# Patient Record
Sex: MALE | HISPANIC OR LATINO | URBAN - METROPOLITAN AREA
[De-identification: names, ages, dates, MRNs, and addresses within clinical notes are randomized per-mention and may not be internally consistent; named-entity substitution may affect disease eponyms.]

---

## 2019-01-16 ENCOUNTER — OFFICE VISIT (OUTPATIENT)
Dept: URBAN - METROPOLITAN AREA CLINIC 32 | Facility: CLINIC | Age: 24
End: 2019-01-16
Payer: COMMERCIAL

## 2019-01-16 PROCEDURE — 92014 COMPRE OPH EXAM EST PT 1/>: CPT | Performed by: OPTOMETRIST

## 2019-01-16 NOTE — IMPRESSION/PLAN
Impression: Diagnosis: Keratoconus, stable, bilateral. Code: N43.672. Plan: matthew OU - shows KCN or variant OS> OD.   rec RGP Lens fit. 500/500/500

## 2021-03-05 ENCOUNTER — OFFICE VISIT (OUTPATIENT)
Dept: URBAN - METROPOLITAN AREA CLINIC 32 | Facility: CLINIC | Age: 26
End: 2021-03-05

## 2021-03-05 DIAGNOSIS — H52.13 MYOPIA, BILATERAL: ICD-10-CM

## 2021-03-05 PROCEDURE — 99214 OFFICE O/P EST MOD 30 MIN: CPT | Performed by: OPTOMETRIST

## 2021-03-05 PROCEDURE — 92310 CONTACT LENS FITTING OU: CPT | Performed by: OPTOMETRIST

## 2021-03-05 RX ORDER — POLYETHYLENE GLYCOL 400 AND PROPYLENE GLYCOL 4; 3 MG/ML; MG/ML
SOLUTION/ DROPS OPHTHALMIC
Qty: 60 | Refills: 10 | Status: ACTIVE
Start: 2021-03-05

## 2021-03-05 ASSESSMENT — KERATOMETRY
OD: 47.25
OS: 52.63

## 2021-03-05 ASSESSMENT — VISUAL ACUITY
OD: 20/50
OS: 20/50

## 2021-03-05 ASSESSMENT — INTRAOCULAR PRESSURE
OD: 12
OS: 13

## 2021-03-05 NOTE — IMPRESSION/PLAN
Impression: Diagnosis: Keratoconus, stable, bilateral. Code: M27.345.  Plan: refit RGP order lenses, AT /500/500

## 2021-04-02 ENCOUNTER — OFFICE VISIT (OUTPATIENT)
Dept: URBAN - METROPOLITAN AREA CLINIC 32 | Facility: CLINIC | Age: 26
End: 2021-04-02

## 2021-04-02 DIAGNOSIS — H18.613 KERATOCONUS, STABLE, BILATERAL: Primary | ICD-10-CM

## 2021-04-02 PROCEDURE — V2799 MISC VISION ITEM OR SERVICE: HCPCS | Performed by: OPTOMETRIST

## 2021-04-02 NOTE — IMPRESSION/PLAN
Impression: Diagnosis: Keratoconus, stable, bilateral. Code: Q14.477.  Plan: doing well with new lenses

## 2023-12-27 ENCOUNTER — OFFICE VISIT (OUTPATIENT)
Dept: URBAN - METROPOLITAN AREA CLINIC 32 | Facility: CLINIC | Age: 28
End: 2023-12-27
Payer: COMMERCIAL

## 2023-12-27 DIAGNOSIS — H52.13 MYOPIA, BILATERAL: Primary | ICD-10-CM

## 2023-12-27 DIAGNOSIS — H04.123 DRY EYE SYNDROME OF BILATERAL LACRIMAL GLANDS: ICD-10-CM

## 2023-12-27 DIAGNOSIS — H16.223 KERATOCONJUNCTIVITIS SICCA, NOT SPECIFIED AS SJ”GREN'S, BILATERAL: ICD-10-CM

## 2023-12-27 DIAGNOSIS — H18.613 KERATOCONUS, STABLE, BILATERAL: ICD-10-CM

## 2023-12-27 PROCEDURE — 92014 COMPRE OPH EXAM EST PT 1/>: CPT | Performed by: OPTOMETRIST

## 2023-12-27 RX ORDER — CYCLOSPORINE 0.5 MG/ML
0.05 % EMULSION OPHTHALMIC
Qty: 180 | Refills: 3 | Status: INACTIVE
Start: 2023-12-27 | End: 2024-03-25

## 2023-12-27 RX ORDER — PREDNISOLONE ACETATE 10 MG/ML
1 % SUSPENSION/ DROPS OPHTHALMIC
Qty: 5 | Refills: 1 | Status: ACTIVE
Start: 2023-12-27

## 2023-12-27 ASSESSMENT — VISUAL ACUITY
OD: 20/25
OS: 20/80

## 2023-12-27 ASSESSMENT — INTRAOCULAR PRESSURE
OS: 11
OD: 13

## 2024-02-29 ENCOUNTER — OFFICE VISIT (OUTPATIENT)
Dept: URBAN - METROPOLITAN AREA CLINIC 32 | Facility: CLINIC | Age: 29
End: 2024-02-29
Payer: COMMERCIAL

## 2024-02-29 DIAGNOSIS — H18.613 KERATOCONUS, STABLE, BILATERAL: Primary | ICD-10-CM

## 2024-02-29 PROCEDURE — 99213 OFFICE O/P EST LOW 20 MIN: CPT | Performed by: OPTOMETRIST

## 2024-05-22 ENCOUNTER — OFFICE VISIT (OUTPATIENT)
Dept: URBAN - METROPOLITAN AREA CLINIC 32 | Facility: CLINIC | Age: 29
End: 2024-05-22
Payer: COMMERCIAL

## 2024-05-22 DIAGNOSIS — H04.123 DRY EYE SYNDROME OF BILATERAL LACRIMAL GLANDS: ICD-10-CM

## 2024-05-22 DIAGNOSIS — H16.223 KERATOCONJUNCTIVITIS SICCA, NOT SPECIFIED AS SJ”GREN'S, BILATERAL: ICD-10-CM

## 2024-05-22 DIAGNOSIS — H18.613 KERATOCONUS, STABLE, BILATERAL: Primary | ICD-10-CM

## 2024-05-22 PROCEDURE — 99213 OFFICE O/P EST LOW 20 MIN: CPT | Performed by: OPTOMETRIST

## 2024-05-22 RX ORDER — LOTEPREDNOL ETABONATE 2.5 MG/ML
0.25 % SUSPENSION/ DROPS OPHTHALMIC
Qty: 2.8 | Refills: 10 | Status: ACTIVE
Start: 2024-05-22

## 2024-05-22 RX ORDER — CYCLOSPORINE 0.5 MG/ML
0.05 % EMULSION OPHTHALMIC
Qty: 180 | Refills: 3 | Status: ACTIVE
Start: 2024-05-22

## 2024-10-09 ENCOUNTER — OFFICE VISIT (OUTPATIENT)
Dept: URBAN - METROPOLITAN AREA CLINIC 32 | Facility: CLINIC | Age: 29
End: 2024-10-09
Payer: COMMERCIAL

## 2024-10-09 DIAGNOSIS — H18.613 KERATOCONUS, STABLE, BILATERAL: Primary | ICD-10-CM

## 2024-10-09 PROCEDURE — 99214 OFFICE O/P EST MOD 30 MIN: CPT | Performed by: OPTOMETRIST

## 2024-10-09 PROCEDURE — 92025 CPTRIZED CORNEAL TOPOGRAPHY: CPT | Performed by: OPTOMETRIST

## 2024-10-09 ASSESSMENT — INTRAOCULAR PRESSURE
OS: 14
OD: 15

## 2024-10-09 ASSESSMENT — VISUAL ACUITY
OS: 20/80
OD: 20/25